# Patient Record
Sex: FEMALE | Race: OTHER
[De-identification: names, ages, dates, MRNs, and addresses within clinical notes are randomized per-mention and may not be internally consistent; named-entity substitution may affect disease eponyms.]

---

## 2019-04-11 ENCOUNTER — HOSPITAL ENCOUNTER (EMERGENCY)
Dept: HOSPITAL 74 - JER | Age: 24
Discharge: HOME | End: 2019-04-11
Payer: SELF-PAY

## 2019-04-11 VITALS — DIASTOLIC BLOOD PRESSURE: 55 MMHG | HEART RATE: 65 BPM | SYSTOLIC BLOOD PRESSURE: 102 MMHG

## 2019-04-11 VITALS — BODY MASS INDEX: 28.1 KG/M2

## 2019-04-11 VITALS — TEMPERATURE: 99.3 F

## 2019-04-11 DIAGNOSIS — N93.9: Primary | ICD-10-CM

## 2019-04-11 LAB
APPEARANCE UR: CLEAR
BACTERIA #/AREA URNS HPF: 92.6 /HPF
BILIRUB UR STRIP.AUTO-MCNC: NEGATIVE MG/DL
CASTS #/AREA URNS LPF: 1 /HPF (ref 0–8)
COLOR UR: (no result)
EPITH CASTS URNS QL MICRO: 2.9 /HPF
KETONES UR QL STRIP: NEGATIVE
LEUKOCYTE ESTERASE UR QL STRIP.AUTO: (no result)
NITRITE UR QL STRIP: NEGATIVE
PH UR: 6 [PH] (ref 5–8)
PROT UR QL STRIP: (no result)
PROT UR QL STRIP: NEGATIVE
RBC # BLD AUTO: 91 /HPF (ref 0–4)
SP GR UR: 1.01 (ref 1.01–1.03)
UROBILINOGEN UR STRIP-MCNC: 0.2 MG/DL (ref 0.2–1)
WBC # UR AUTO: 7 /HPF (ref 0–5)

## 2019-04-11 NOTE — PDOC
History of Present Illness





- General


Chief Complaint: Vaginal Bleeding


Stated Complaint: VAGINAL BLEED


Time Seen by Provider: 19 12:40


History Source: Patient





- History of Present Illness


Timing/Duration: reports: constant





Past History





- Past Medical History


Allergies/Adverse Reactions: 


 Allergies











Allergy/AdvReac Type Severity Reaction Status Date / Time


 


No Known Allergies Allergy   Verified 19 12:16











Home Medications: 


Ambulatory Orders





NK [No Known Home Medication]  19 








COPD: No


GI Disorders: No


Liver Disease: No





- Immunization History


Immunization Up to Date: No





- Suicide/Smoking/Psychosocial Hx


Smoking History: Never smoked


Have you smoked in the past 12 months: No


Information on smoking cessation initiated: No


Hx Alcohol Use: No


Drug/Substance Use Hx: No





**Review of Systems





- Review of Systems


Constitutional: No: Chills, Fever, Weakness


ABD/GI: No: Nausea, Vomiting, Abdominal cramping


: No: Dysuria, Flank Pain


Musculoskeletal: No: Back Pain





*Physical Exam





- Vital Signs


 Last Vital Signs











Temp Pulse Resp BP Pulse Ox


 


 99.3 F   65   18   102/55 L  98 


 


 19 12:12  19 12:12  19 12:12  19 12:12  19 12:12














- Physical Exam


General Appearance: Yes: Appropriately Dressed.  No: Apparent Distress


HEENT: positive: Normal Voice


Neck: positive: Supple


Respiratory/Chest: negative: Respiratory Distress


Gastrointestinal/Abdominal: positive: Soft.  negative: Tender


Musculoskeletal: negative: CVA Tenderness


Integumentary: positive: Dry, Warm


Neurologic: positive: Fully Oriented, Alert, Normal Mood/Affect





Medical Decision Making





- Medical Decision Making





19 12:50


25 yo F, , here w/ vaginal bleeding this am. Is concerned that she may be 

pregnant as missed menses this month (due 8 days ago), though did not do a home 

preg test. No abd pain, dysuria, n/v/f/c





See exam





Irreg vag bleed


R/o preg (missed menses this month)


Stable w/ unremarkable exam


-upreg pending and if +, will do further w/u











19 13:13


Upreg neg. Vag bleed m/l menses. No further w/u needed in ED. Dc to f/u with 

GYN as needed











*DC/Admit/Observation/Transfer


Diagnosis at time of Disposition: 


 Vaginal bleeding








- Discharge Dispostion


Disposition: HOME


Condition at time of disposition: Good





- Referrals





- Patient Instructions


Additional Instructions: 





La prueba de embarazo es negativa aqu. Ortega sangrado vaginal es ms probable ortega 

menstruacin.


Por favor jodie un seguimiento con ortega gineclogo segn sea necesario





- Post Discharge Activity

## 2019-11-13 ENCOUNTER — HOSPITAL ENCOUNTER (EMERGENCY)
Dept: HOSPITAL 74 - JER | Age: 24
Discharge: HOME | End: 2019-11-13
Payer: SELF-PAY

## 2019-11-13 VITALS — HEART RATE: 76 BPM

## 2019-11-13 VITALS — TEMPERATURE: 97.8 F

## 2019-11-13 VITALS — BODY MASS INDEX: 25.7 KG/M2

## 2019-11-13 VITALS — DIASTOLIC BLOOD PRESSURE: 62 MMHG | SYSTOLIC BLOOD PRESSURE: 111 MMHG

## 2019-11-13 DIAGNOSIS — R10.9: ICD-10-CM

## 2019-11-13 DIAGNOSIS — Z3A.28: ICD-10-CM

## 2019-11-13 DIAGNOSIS — O26.893: Primary | ICD-10-CM

## 2019-11-13 LAB
APPEARANCE UR: (no result)
BILIRUB UR STRIP.AUTO-MCNC: NEGATIVE MG/DL
COLOR UR: YELLOW
KETONES UR QL STRIP: NEGATIVE
LEUKOCYTE ESTERASE UR QL STRIP.AUTO: NEGATIVE
NITRITE UR QL STRIP: NEGATIVE
PH UR: 6.5 [PH] (ref 5–8)
PROT UR QL STRIP: NEGATIVE
PROT UR QL STRIP: NEGATIVE
SP GR UR: 1.02 (ref 1.01–1.03)
UROBILINOGEN UR STRIP-MCNC: 0.2 MG/DL (ref 0.2–1)

## 2019-11-13 NOTE — PDOC
Rapid Medical Evaluation


Chief Complaint: Headache


Time Seen by Provider: 11/13/19 12:04


Medical Evaluation: 


 Allergies











Allergy/AdvReac Type Severity Reaction Status Date / Time


 


No Known Allergies Allergy   Verified 11/13/19 11:44








Vital Signs











Temp Pulse Resp BP Pulse Ox


 


 97.9 F   76   18   120/71   99 


 


 11/13/19 11:42  11/13/19 11:42  11/13/19 11:42  11/13/19 11:42  11/13/19 11:42








11/13/19 12:14


The patient is a 24-year-old female G2, P1, 7 months pregnant, presents to the 

ER today for lower abdominal pain starting yesterday.  She states that the pain 

is sitting low and she states the pain is constant.  Denies nausea, vomiting, 

diarrhea, vaginal bleeding or fluid from the vagina.  She also admits to a 

headache at this time.


Exam: Tenderness palpation of the lower abdomen diffusely; no gross neuro 

deficits.


Patient to head up to L&D for fetal monitoring and evaluation of her abdominal 

pain.





Patient is currently visiting from [place] and she has an OB/GYN in [place].





**Discharge Disposition





- Diagnosis


 Abdominal pain affecting pregnancy








- Referrals





- Patient Instructions





- Post Discharge Activity

## 2019-11-13 NOTE — PN
Ante-Partal Exam





- Subjective


Subjective: 





Patient presenting with pelvic pressure/cramping and mild headache.  No VB, no 

LOF, +FM.She reports prenatal care at her [place]. She denies any 

complications with the pregnancy and a due date of 1/16/19. She reports recent 

sexual intercourse.


Vital Signs: 


 Vital Signs











Temperature  97.9 F   11/13/19 11:42


 


Pulse Rate  76   11/13/19 11:42


 


Respiratory Rate  18   11/13/19 11:42


 


Blood Pressure  120/71   11/13/19 11:42


 


O2 Sat by Pulse Oximetry (%)  99   11/13/19 11:42











Bleeding: No


Headache: No


Visual changes: No


Right upper quadrant pain: No





- Contractions


Contractions: Yes


Regularity: Irritability


Intensity: Mild


Fetal Monitor Mode: External





- Exam during Labor


Fetal Heart Rate: 135


Variability: Moderate


Category: I


Fetal Monitor Accelerations: Present


Fetal Monitor Decelerations: None


Exam: Vaginal


Dilatation (cm): 0


Effacement (%): 0


Amniotic Membrane Status: Intact


Fetal Presentation: Vertex


Fetal Station: -3


Remarks: 





Bedside sono: posterior placenta, cephalic presentation, +FM, YUE subjectively 

normal.


Abd: soft, gravid, n/d, n/t to palpation, no incisional tenderness, no guarding

, no rebound.





- Assessment/Plan


Assessment/Plan: 





24 y/p P1 @ 30.6wks by KHANG provided by patient presenting for rule out PTL, 

stable maternal condition, reassuring fetal status, no available prenatal care 

information, recent sexual intercourse.


-UA


-PO hydration


-Official sono


-Prenatal care at [facility] recommended

## 2021-09-05 ENCOUNTER — HOSPITAL ENCOUNTER (OUTPATIENT)
Dept: HOSPITAL 74 - JER | Age: 26
Setting detail: OBSERVATION
LOS: 2 days | Discharge: HOME | End: 2021-09-07
Attending: GENERAL ACUTE CARE HOSPITAL | Admitting: HOSPITALIST
Payer: COMMERCIAL

## 2021-09-05 VITALS — BODY MASS INDEX: 29.6 KG/M2

## 2021-09-05 DIAGNOSIS — R07.0: Primary | ICD-10-CM

## 2021-09-05 DIAGNOSIS — M54.2: ICD-10-CM

## 2021-09-05 PROCEDURE — U0005 INFEC AGEN DETEC AMPLI PROBE: HCPCS

## 2021-09-05 PROCEDURE — G0378 HOSPITAL OBSERVATION PER HR: HCPCS

## 2021-09-05 PROCEDURE — U0003 INFECTIOUS AGENT DETECTION BY NUCLEIC ACID (DNA OR RNA); SEVERE ACUTE RESPIRATORY SYNDROME CORONAVIRUS 2 (SARS-COV-2) (CORONAVIRUS DISEASE [COVID-19]), AMPLIFIED PROBE TECHNIQUE, MAKING USE OF HIGH THROUGHPUT TECHNOLOGIES AS DESCRIBED BY CMS-2020-01-R: HCPCS

## 2021-09-05 PROCEDURE — C9803 HOPD COVID-19 SPEC COLLECT: HCPCS

## 2021-09-06 LAB
ALBUMIN SERPL-MCNC: 3.6 G/DL (ref 3.4–5)
ALP SERPL-CCNC: 84 U/L (ref 45–117)
ALT SERPL-CCNC: 44 U/L (ref 13–61)
ANION GAP SERPL CALC-SCNC: 7 MMOL/L (ref 8–16)
AST SERPL-CCNC: 20 U/L (ref 15–37)
BASOPHILS # BLD: 0.4 % (ref 0–2)
BILIRUB SERPL-MCNC: 0.3 MG/DL (ref 0.2–1)
BUN SERPL-MCNC: 10.4 MG/DL (ref 7–18)
CALCIUM SERPL-MCNC: 8.3 MG/DL (ref 8.5–10.1)
CHLORIDE SERPL-SCNC: 109 MMOL/L (ref 98–107)
CO2 SERPL-SCNC: 24 MMOL/L (ref 21–32)
CREAT SERPL-MCNC: 0.6 MG/DL (ref 0.55–1.3)
DEPRECATED RDW RBC AUTO: 13.7 % (ref 11.6–15.6)
EOSINOPHIL # BLD: 1.5 % (ref 0–4.5)
GLUCOSE SERPL-MCNC: 102 MG/DL (ref 74–106)
HCT VFR BLD CALC: 37.4 % (ref 32.4–45.2)
HGB BLD-MCNC: 12.9 GM/DL (ref 10.7–15.3)
LYMPHOCYTES # BLD: 21.6 % (ref 8–40)
MCH RBC QN AUTO: 31 PG (ref 25.7–33.7)
MCHC RBC AUTO-ENTMCNC: 34.4 G/DL (ref 32–36)
MCV RBC: 90.1 FL (ref 80–96)
MONOCYTES # BLD AUTO: 7.1 % (ref 3.8–10.2)
NEUTROPHILS # BLD: 69.4 % (ref 42.8–82.8)
PLATELET # BLD AUTO: 329 10^3/UL (ref 134–434)
PMV BLD: 7 FL (ref 7.5–11.1)
PROT SERPL-MCNC: 7.8 G/DL (ref 6.4–8.2)
RBC # BLD AUTO: 4.15 M/MM3 (ref 3.6–5.2)
SODIUM SERPL-SCNC: 140 MMOL/L (ref 136–145)
WBC # BLD AUTO: 10.3 K/MM3 (ref 4–10)

## 2021-09-06 PROCEDURE — 3E03329 INTRODUCTION OF OTHER ANTI-INFECTIVE INTO PERIPHERAL VEIN, PERCUTANEOUS APPROACH: ICD-10-PCS | Performed by: GENERAL ACUTE CARE HOSPITAL

## 2021-09-06 PROCEDURE — 3E0337Z INTRODUCTION OF ELECTROLYTIC AND WATER BALANCE SUBSTANCE INTO PERIPHERAL VEIN, PERCUTANEOUS APPROACH: ICD-10-PCS | Performed by: GENERAL ACUTE CARE HOSPITAL

## 2021-09-07 VITALS — DIASTOLIC BLOOD PRESSURE: 60 MMHG | SYSTOLIC BLOOD PRESSURE: 119 MMHG | TEMPERATURE: 97.8 F | HEART RATE: 77 BPM

## 2021-09-07 LAB
ANION GAP SERPL CALC-SCNC: 7 MMOL/L (ref 8–16)
BUN SERPL-MCNC: 9.9 MG/DL (ref 7–18)
CALCIUM SERPL-MCNC: 7.9 MG/DL (ref 8.5–10.1)
CHLORIDE SERPL-SCNC: 109 MMOL/L (ref 98–107)
CO2 SERPL-SCNC: 24 MMOL/L (ref 21–32)
CREAT SERPL-MCNC: 0.6 MG/DL (ref 0.55–1.3)
DEPRECATED RDW RBC AUTO: 13.5 % (ref 11.6–15.6)
GLUCOSE SERPL-MCNC: 96 MG/DL (ref 74–106)
HCT VFR BLD CALC: 37.5 % (ref 32.4–45.2)
HGB BLD-MCNC: 13 GM/DL (ref 10.7–15.3)
MAGNESIUM SERPL-MCNC: 2.1 MG/DL (ref 1.8–2.4)
MCH RBC QN AUTO: 31.2 PG (ref 25.7–33.7)
MCHC RBC AUTO-ENTMCNC: 34.6 G/DL (ref 32–36)
MCV RBC: 90.3 FL (ref 80–96)
PHOSPHATE SERPL-MCNC: 4.8 MG/DL (ref 2.5–4.9)
PLATELET # BLD AUTO: 328 10^3/UL (ref 134–434)
PMV BLD: 7 FL (ref 7.5–11.1)
RBC # BLD AUTO: 4.16 M/MM3 (ref 3.6–5.2)
SODIUM SERPL-SCNC: 140 MMOL/L (ref 136–145)
WBC # BLD AUTO: 5.1 K/MM3 (ref 4–10)